# Patient Record
Sex: FEMALE | URBAN - METROPOLITAN AREA
[De-identification: names, ages, dates, MRNs, and addresses within clinical notes are randomized per-mention and may not be internally consistent; named-entity substitution may affect disease eponyms.]

---

## 2017-09-23 ENCOUNTER — INPATIENT (INPATIENT)
Facility: HOSPITAL | Age: 57
LOS: 1 days | Discharge: HOME | End: 2017-09-25
Attending: FAMILY MEDICINE | Admitting: FAMILY MEDICINE
Payer: COMMERCIAL

## 2017-09-23 VITALS
HEART RATE: 92 BPM | OXYGEN SATURATION: 100 % | WEIGHT: 139.99 LBS | DIASTOLIC BLOOD PRESSURE: 105 MMHG | HEIGHT: 62 IN | TEMPERATURE: 98 F | SYSTOLIC BLOOD PRESSURE: 185 MMHG | RESPIRATION RATE: 18 BRPM

## 2017-09-23 DIAGNOSIS — I20.0 UNSTABLE ANGINA: ICD-10-CM

## 2017-09-23 DIAGNOSIS — I10 ESSENTIAL (PRIMARY) HYPERTENSION: ICD-10-CM

## 2017-09-23 DIAGNOSIS — E89.40 ASYMPTOMATIC POSTPROCEDURAL OVARIAN FAILURE: Chronic | ICD-10-CM

## 2017-09-23 LAB
ALBUMIN SERPL ELPH-MCNC: 3.6 G/DL — SIGNIFICANT CHANGE UP (ref 3.3–5)
ALP SERPL-CCNC: 119 U/L — SIGNIFICANT CHANGE UP (ref 40–120)
ALT FLD-CCNC: 47 U/L — SIGNIFICANT CHANGE UP (ref 12–78)
ANION GAP SERPL CALC-SCNC: 7 MMOL/L — SIGNIFICANT CHANGE UP (ref 5–17)
APTT BLD: 103.4 SEC — HIGH (ref 27.5–37.4)
APTT BLD: 32.8 SEC — SIGNIFICANT CHANGE UP (ref 27.5–37.4)
APTT BLD: 53.2 SEC — HIGH (ref 27.5–37.4)
AST SERPL-CCNC: 32 U/L — SIGNIFICANT CHANGE UP (ref 15–37)
BASOPHILS # BLD AUTO: 0.1 K/UL — SIGNIFICANT CHANGE UP (ref 0–0.2)
BASOPHILS NFR BLD AUTO: 1 % — SIGNIFICANT CHANGE UP (ref 0–2)
BILIRUB SERPL-MCNC: 0.3 MG/DL — SIGNIFICANT CHANGE UP (ref 0.2–1.2)
BUN SERPL-MCNC: 13 MG/DL — SIGNIFICANT CHANGE UP (ref 7–23)
CALCIUM SERPL-MCNC: 9 MG/DL — SIGNIFICANT CHANGE UP (ref 8.5–10.1)
CHLORIDE SERPL-SCNC: 106 MMOL/L — SIGNIFICANT CHANGE UP (ref 96–108)
CHOLEST SERPL-MCNC: 224 MG/DL — HIGH (ref 10–199)
CK SERPL-CCNC: 120 U/L — SIGNIFICANT CHANGE UP (ref 26–192)
CO2 SERPL-SCNC: 27 MMOL/L — SIGNIFICANT CHANGE UP (ref 22–31)
CREAT SERPL-MCNC: 0.82 MG/DL — SIGNIFICANT CHANGE UP (ref 0.5–1.3)
EOSINOPHIL # BLD AUTO: 0.2 K/UL — SIGNIFICANT CHANGE UP (ref 0–0.5)
EOSINOPHIL NFR BLD AUTO: 3.8 % — SIGNIFICANT CHANGE UP (ref 0–6)
GLUCOSE SERPL-MCNC: 137 MG/DL — HIGH (ref 70–99)
HBA1C BLD-MCNC: 6.6 % — HIGH (ref 4–5.6)
HCT VFR BLD CALC: 42.5 % — SIGNIFICANT CHANGE UP (ref 34.5–45)
HCT VFR BLD CALC: 44.4 % — SIGNIFICANT CHANGE UP (ref 34.5–45)
HDLC SERPL-MCNC: 69 MG/DL — SIGNIFICANT CHANGE UP (ref 40–125)
HGB BLD-MCNC: 13.9 G/DL — SIGNIFICANT CHANGE UP (ref 11.5–15.5)
HGB BLD-MCNC: 14.5 G/DL — SIGNIFICANT CHANGE UP (ref 11.5–15.5)
INR BLD: 0.93 RATIO — SIGNIFICANT CHANGE UP (ref 0.88–1.16)
LIPID PNL WITH DIRECT LDL SERPL: 127 MG/DL — SIGNIFICANT CHANGE UP
LYMPHOCYTES # BLD AUTO: 2.5 K/UL — SIGNIFICANT CHANGE UP (ref 1–3.3)
LYMPHOCYTES # BLD AUTO: 44.3 % — HIGH (ref 13–44)
MCHC RBC-ENTMCNC: 28.9 PG — SIGNIFICANT CHANGE UP (ref 27–34)
MCHC RBC-ENTMCNC: 28.9 PG — SIGNIFICANT CHANGE UP (ref 27–34)
MCHC RBC-ENTMCNC: 32.6 GM/DL — SIGNIFICANT CHANGE UP (ref 32–36)
MCHC RBC-ENTMCNC: 32.7 GM/DL — SIGNIFICANT CHANGE UP (ref 32–36)
MCV RBC AUTO: 88.4 FL — SIGNIFICANT CHANGE UP (ref 80–100)
MCV RBC AUTO: 88.6 FL — SIGNIFICANT CHANGE UP (ref 80–100)
MONOCYTES # BLD AUTO: 0.4 K/UL — SIGNIFICANT CHANGE UP (ref 0–0.9)
MONOCYTES NFR BLD AUTO: 7.8 % — SIGNIFICANT CHANGE UP (ref 2–14)
NEUTROPHILS # BLD AUTO: 2.4 K/UL — SIGNIFICANT CHANGE UP (ref 1.8–7.4)
NEUTROPHILS NFR BLD AUTO: 43.1 % — SIGNIFICANT CHANGE UP (ref 43–77)
NT-PROBNP SERPL-SCNC: 27 PG/ML — SIGNIFICANT CHANGE UP (ref 0–125)
PLATELET # BLD AUTO: 189 K/UL — SIGNIFICANT CHANGE UP (ref 150–400)
PLATELET # BLD AUTO: 210 K/UL — SIGNIFICANT CHANGE UP (ref 150–400)
POTASSIUM SERPL-MCNC: 3.5 MMOL/L — SIGNIFICANT CHANGE UP (ref 3.5–5.3)
POTASSIUM SERPL-SCNC: 3.5 MMOL/L — SIGNIFICANT CHANGE UP (ref 3.5–5.3)
PROT SERPL-MCNC: 7.8 GM/DL — SIGNIFICANT CHANGE UP (ref 6–8.3)
PROTHROM AB SERPL-ACNC: 10 SEC — SIGNIFICANT CHANGE UP (ref 9.8–12.7)
RBC # BLD: 4.8 M/UL — SIGNIFICANT CHANGE UP (ref 3.8–5.2)
RBC # BLD: 5.03 M/UL — SIGNIFICANT CHANGE UP (ref 3.8–5.2)
RBC # FLD: 12.8 % — SIGNIFICANT CHANGE UP (ref 10.3–14.5)
RBC # FLD: 12.9 % — SIGNIFICANT CHANGE UP (ref 10.3–14.5)
SODIUM SERPL-SCNC: 140 MMOL/L — SIGNIFICANT CHANGE UP (ref 135–145)
TOTAL CHOLESTEROL/HDL RATIO MEASUREMENT: 3.2 RATIO — LOW (ref 3.3–7.1)
TRIGL SERPL-MCNC: 142 MG/DL — SIGNIFICANT CHANGE UP (ref 10–149)
TROPONIN I SERPL-MCNC: 0.02 NG/ML — SIGNIFICANT CHANGE UP (ref 0.01–0.04)
TROPONIN I SERPL-MCNC: 0.03 NG/ML — SIGNIFICANT CHANGE UP (ref 0.01–0.04)
TROPONIN I SERPL-MCNC: 0.03 NG/ML — SIGNIFICANT CHANGE UP (ref 0.01–0.04)
WBC # BLD: 5.6 K/UL — SIGNIFICANT CHANGE UP (ref 3.8–10.5)
WBC # BLD: 6 K/UL — SIGNIFICANT CHANGE UP (ref 3.8–10.5)
WBC # FLD AUTO: 5.6 K/UL — SIGNIFICANT CHANGE UP (ref 3.8–10.5)
WBC # FLD AUTO: 6 K/UL — SIGNIFICANT CHANGE UP (ref 3.8–10.5)

## 2017-09-23 PROCEDURE — 93306 TTE W/DOPPLER COMPLETE: CPT | Mod: 26

## 2017-09-23 PROCEDURE — 99285 EMERGENCY DEPT VISIT HI MDM: CPT

## 2017-09-23 PROCEDURE — 71010: CPT | Mod: 26

## 2017-09-23 PROCEDURE — 93010 ELECTROCARDIOGRAM REPORT: CPT

## 2017-09-23 RX ORDER — LOSARTAN POTASSIUM 100 MG/1
50 TABLET, FILM COATED ORAL DAILY
Qty: 0 | Refills: 0 | Status: DISCONTINUED | OUTPATIENT
Start: 2017-09-23 | End: 2017-09-24

## 2017-09-23 RX ORDER — HEPARIN SODIUM 5000 [USP'U]/ML
3800 INJECTION INTRAVENOUS; SUBCUTANEOUS EVERY 6 HOURS
Qty: 0 | Refills: 0 | Status: DISCONTINUED | OUTPATIENT
Start: 2017-09-23 | End: 2017-09-23

## 2017-09-23 RX ORDER — HEPARIN SODIUM 5000 [USP'U]/ML
3800 INJECTION INTRAVENOUS; SUBCUTANEOUS ONCE
Qty: 0 | Refills: 0 | Status: DISCONTINUED | OUTPATIENT
Start: 2017-09-23 | End: 2017-09-23

## 2017-09-23 RX ORDER — AMLODIPINE BESYLATE 2.5 MG/1
5 TABLET ORAL DAILY
Qty: 0 | Refills: 0 | Status: DISCONTINUED | OUTPATIENT
Start: 2017-09-23 | End: 2017-09-24

## 2017-09-23 RX ORDER — HEPARIN SODIUM 5000 [USP'U]/ML
4100 INJECTION INTRAVENOUS; SUBCUTANEOUS ONCE
Qty: 0 | Refills: 0 | Status: COMPLETED | OUTPATIENT
Start: 2017-09-23 | End: 2017-09-23

## 2017-09-23 RX ORDER — ATORVASTATIN CALCIUM 80 MG/1
20 TABLET, FILM COATED ORAL AT BEDTIME
Qty: 0 | Refills: 0 | Status: DISCONTINUED | OUTPATIENT
Start: 2017-09-23 | End: 2017-09-25

## 2017-09-23 RX ORDER — HEPARIN SODIUM 5000 [USP'U]/ML
4100 INJECTION INTRAVENOUS; SUBCUTANEOUS EVERY 6 HOURS
Qty: 0 | Refills: 0 | Status: DISCONTINUED | OUTPATIENT
Start: 2017-09-23 | End: 2017-09-25

## 2017-09-23 RX ORDER — METOPROLOL TARTRATE 50 MG
50 TABLET ORAL
Qty: 0 | Refills: 0 | Status: DISCONTINUED | OUTPATIENT
Start: 2017-09-23 | End: 2017-09-25

## 2017-09-23 RX ORDER — ASPIRIN/CALCIUM CARB/MAGNESIUM 324 MG
325 TABLET ORAL ONCE
Qty: 0 | Refills: 0 | Status: COMPLETED | OUTPATIENT
Start: 2017-09-23 | End: 2017-09-23

## 2017-09-23 RX ORDER — ASPIRIN/CALCIUM CARB/MAGNESIUM 324 MG
162 TABLET ORAL DAILY
Qty: 0 | Refills: 0 | Status: DISCONTINUED | OUTPATIENT
Start: 2017-09-23 | End: 2017-09-25

## 2017-09-23 RX ORDER — HEPARIN SODIUM 5000 [USP'U]/ML
INJECTION INTRAVENOUS; SUBCUTANEOUS
Qty: 25000 | Refills: 0 | Status: DISCONTINUED | OUTPATIENT
Start: 2017-09-23 | End: 2017-09-23

## 2017-09-23 RX ORDER — SODIUM CHLORIDE 9 MG/ML
3 INJECTION INTRAMUSCULAR; INTRAVENOUS; SUBCUTANEOUS ONCE
Qty: 0 | Refills: 0 | Status: COMPLETED | OUTPATIENT
Start: 2017-09-23 | End: 2017-09-23

## 2017-09-23 RX ORDER — HEPARIN SODIUM 5000 [USP'U]/ML
INJECTION INTRAVENOUS; SUBCUTANEOUS
Qty: 25000 | Refills: 0 | Status: DISCONTINUED | OUTPATIENT
Start: 2017-09-23 | End: 2017-09-25

## 2017-09-23 RX ADMIN — Medication 50 MILLIGRAM(S): at 10:44

## 2017-09-23 RX ADMIN — LOSARTAN POTASSIUM 50 MILLIGRAM(S): 100 TABLET, FILM COATED ORAL at 10:44

## 2017-09-23 RX ADMIN — HEPARIN SODIUM 4100 UNIT(S): 5000 INJECTION INTRAVENOUS; SUBCUTANEOUS at 06:32

## 2017-09-23 RX ADMIN — Medication 325 MILLIGRAM(S): at 02:40

## 2017-09-23 RX ADMIN — AMLODIPINE BESYLATE 5 MILLIGRAM(S): 2.5 TABLET ORAL at 10:44

## 2017-09-23 RX ADMIN — HEPARIN SODIUM 800 UNIT(S)/HR: 5000 INJECTION INTRAVENOUS; SUBCUTANEOUS at 06:32

## 2017-09-23 RX ADMIN — Medication 50 MILLIGRAM(S): at 18:26

## 2017-09-23 RX ADMIN — HEPARIN SODIUM 600 UNIT(S)/HR: 5000 INJECTION INTRAVENOUS; SUBCUTANEOUS at 21:50

## 2017-09-23 RX ADMIN — HEPARIN SODIUM 0 UNIT(S)/HR: 5000 INJECTION INTRAVENOUS; SUBCUTANEOUS at 15:06

## 2017-09-23 RX ADMIN — HEPARIN SODIUM 600 UNIT(S)/HR: 5000 INJECTION INTRAVENOUS; SUBCUTANEOUS at 16:00

## 2017-09-23 RX ADMIN — SODIUM CHLORIDE 3 MILLILITER(S): 9 INJECTION INTRAMUSCULAR; INTRAVENOUS; SUBCUTANEOUS at 03:05

## 2017-09-23 NOTE — H&P ADULT - ASSESSMENT
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 States she was sleeping and felt "heart burn, couldn't breathe" and woke up. Reports that pain has resolved upon arrival. Mentions that she has had similar episodes before. Notes that she was doing nothing out of the ordinary but did rush from basement for alarm. Pt has no other complaints and denies diaphoresis, lightheadedness, fever/chills, n/v/d and HA. Admitting being under a lot of stress related to job. Being admitted for ACS/Unstable angina with elevated blood pressure and abnormal EKG.

## 2017-09-23 NOTE — H&P ADULT - FAMILY HISTORY
Mother  Still living? Yes, Estimated age: 71-80  Family history of premature CAD, Age at diagnosis: Age Unknown  Family history of diabetes mellitus type II, Age at diagnosis: Age Unknown

## 2017-09-23 NOTE — ED ADULT NURSE NOTE - OBJECTIVE STATEMENT
pt states she woke up feeling short of breath. pt also c/o "slight chest pain at that time." Pt currently states shes feeling "less short of breath." pt denies dizziness or chest pain at this time.

## 2017-09-23 NOTE — H&P ADULT - NSHPOUTPATIENTPROVIDERS_GEN_ALL_CORE
Dr. Sky Raines-PCP (Cell given by patient [phone] and notified)  Dr. Edith Hamilton-cardiologist

## 2017-09-23 NOTE — H&P ADULT - NSHPATTENDINGPLANDISCUSS_GEN_ALL_CORE
patient, RN and PCP. patient, RN and PCP. Patient aware of medications and test results and in agreement in care plan. Questions answered.

## 2017-09-23 NOTE — H&P ADULT - PROBLEM SELECTOR PLAN 1
On IV Heparin, Aspirin, Lopressor, Losartan and Lipitor. Serial CE. Echo. Cardiology to evaluate for early angio vs stress test. No previous EKG to compare. Sedentary life style with known non obst. CAD with family history. On IV Heparin, Aspirin, Lopressor, Losartan and Lipitor. Serial CE. Echo. Cardiology to evaluate for early angio vs stress test. No previous EKG to compare. Sedentary life style with known non obst. CAD with family history. Aware of IV Heparin ordered placed in ED with side effects of bleeding.

## 2017-09-23 NOTE — H&P ADULT - ATTENDING COMMENTS
Diet: Low salt and low cholesterol.     RADIOLOGY & ADDITIONAL TESTS:    Imaging Personally Reviewed:  [ x] YES  [ ] NO    Consultant(s) Notes Reviewed:  [ ] YES  [ ] NO      DVT Prophylaxis:  IV Heparin [  x]     LMWH [ ]     Coumadin [ ]    Xaeralto [ ]    Eliquis [ ]   Venodyne pumps [ ]    Discussed with Patient [x ]     Family [ ]          [ ]   RN[ x]      [ ]    Advance Directives: Full code.       Care Discussed with Consultants/Other Providers [x ] YES  [ ] NO   PCP Dr. Ha. Diet: Low salt and low cholesterol.     RADIOLOGY & ADDITIONAL TESTS:    Imaging Personally Reviewed:  [ x] YES  [ ] NO    Consultant(s) Notes Reviewed:  [ ] YES  [ ] NO      DVT Prophylaxis:  IV Heparin [  x]     LMWH [ ]     Coumadin [ ]    Xaeralto [ ]    Eliquis [ ]   Venodyne pumps [ ]    Discussed with Patient [x ]     Family [ ]          [ ]   RN[ x]      [ ]    Advance Directives: Full code.       Care Discussed with Consultants/Other Providers [x ] YES  [ ] NO   PCP Dr. Ha. Requested to follow up upon discharge from hospital .

## 2017-09-23 NOTE — ED PROVIDER NOTE - OBJECTIVE STATEMENT
57F pmhx HTN presents to ED c/o SOB. States she was sleeping and felt "heart burn, couldn't breathe" and woke up. Reports that pain has resolved upon arrival. Mentions that she has had similar episodes before. Notes that she was doing nothing out of the ordinary. Pt has no other complaints and denies diaphoresis, lightheadedness, fever/chills, n/v/d and HA.

## 2017-09-23 NOTE — H&P ADULT - NSHPLABSRESULTS_GEN_ALL_CORE
NSR @ 76 with inverted T waves in V4-6 with no previous EKG to compare. NSR @ 76 with inverted T waves in V4-6 with no previous EKG to compare.    Chest x-ray no acute infiltrate or effusions.

## 2017-09-23 NOTE — H&P ADULT - NSHPPHYSICALEXAM_GEN_ALL_CORE
T(C): 36.7 (09-23-17 @ 07:47), Max: 36.9 (09-23-17 @ 05:17)  HR: 85 (09-23-17 @ 08:34) (72 - 92)  BP: 160/89 (09-23-17 @ 08:34) (153/89 - 185/105)  RR: 18 (09-23-17 @ 07:47) (18 - 20)  SpO2: 100% (09-23-17 @ 07:47) (100% - 100%)  Wt(kg): --  I&O's Summary      PHYSICAL EXAM:  GENERAL: NAD, well-groomed, well-developed  HEAD:  Atraumatic, Normocephalic  EYES: EOMI, PERRLA, conjunctiva and sclera clear  ENMT: No tonsillar erythema, exudates, or enlargement; Moist mucous membranes. No lesions.  NECK: Supple, No JVD, Normal thyroid  NERVOUS SYSTEM:  Alert & Oriented X3, Good concentration; Motor Strength 5/5 B/L upper and lower extremities.   CHEST/LUNG: Clear to percussion bilaterally; No rales, rhonchi, wheezing, or rubs  HEART: Regular rate and rhythm; No murmurs, rubs, or gallops  ABDOMEN: Soft, Nontender, Nondistended; Bowel sounds present  EXTREMITIES:  2+ Peripheral Pulses, No clubbing, cyanosis, or edema  LYMPH: No lymphadenopathy noted  SKIN: No rashes or lesions

## 2017-09-23 NOTE — H&P ADULT - HISTORY OF PRESENT ILLNESS
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 States she was sleeping and felt "heart burn, couldn't breathe" and woke up. Reports that pain has resolved upon arrival. Mentions that she has had similar episodes before. Notes that she was doing nothing out of the ordinary but did rush from basement for alarm. Pt has no other complaints and denies diaphoresis, lightheadedness, fever/chills, n/v/d and HA. Admitting being under a lot of stress related to job.

## 2017-09-24 DIAGNOSIS — T78.3XXA ANGIONEUROTIC EDEMA, INITIAL ENCOUNTER: ICD-10-CM

## 2017-09-24 LAB
ANION GAP SERPL CALC-SCNC: 4 MMOL/L — LOW (ref 5–17)
APTT BLD: 55.4 SEC — HIGH (ref 27.5–37.4)
BUN SERPL-MCNC: 13 MG/DL — SIGNIFICANT CHANGE UP (ref 7–23)
CALCIUM SERPL-MCNC: 9.4 MG/DL — SIGNIFICANT CHANGE UP (ref 8.5–10.1)
CHLORIDE SERPL-SCNC: 106 MMOL/L — SIGNIFICANT CHANGE UP (ref 96–108)
CO2 SERPL-SCNC: 29 MMOL/L — SIGNIFICANT CHANGE UP (ref 22–31)
CREAT SERPL-MCNC: 0.87 MG/DL — SIGNIFICANT CHANGE UP (ref 0.5–1.3)
GLUCOSE SERPL-MCNC: 108 MG/DL — HIGH (ref 70–99)
HCT VFR BLD CALC: 44.4 % — SIGNIFICANT CHANGE UP (ref 34.5–45)
HGB BLD-MCNC: 14.5 G/DL — SIGNIFICANT CHANGE UP (ref 11.5–15.5)
MCHC RBC-ENTMCNC: 28.6 PG — SIGNIFICANT CHANGE UP (ref 27–34)
MCHC RBC-ENTMCNC: 32.6 GM/DL — SIGNIFICANT CHANGE UP (ref 32–36)
MCV RBC AUTO: 87.6 FL — SIGNIFICANT CHANGE UP (ref 80–100)
PLATELET # BLD AUTO: 249 K/UL — SIGNIFICANT CHANGE UP (ref 150–400)
POTASSIUM SERPL-MCNC: 3.8 MMOL/L — SIGNIFICANT CHANGE UP (ref 3.5–5.3)
POTASSIUM SERPL-SCNC: 3.8 MMOL/L — SIGNIFICANT CHANGE UP (ref 3.5–5.3)
RBC # BLD: 5.07 M/UL — SIGNIFICANT CHANGE UP (ref 3.8–5.2)
RBC # FLD: 12.7 % — SIGNIFICANT CHANGE UP (ref 10.3–14.5)
SODIUM SERPL-SCNC: 139 MMOL/L — SIGNIFICANT CHANGE UP (ref 135–145)
WBC # BLD: 5.4 K/UL — SIGNIFICANT CHANGE UP (ref 3.8–10.5)
WBC # FLD AUTO: 5.4 K/UL — SIGNIFICANT CHANGE UP (ref 3.8–10.5)

## 2017-09-24 PROCEDURE — 93010 ELECTROCARDIOGRAM REPORT: CPT

## 2017-09-24 RX ORDER — DIPHENHYDRAMINE HCL 50 MG
25 CAPSULE ORAL EVERY 4 HOURS
Qty: 0 | Refills: 0 | Status: DISCONTINUED | OUTPATIENT
Start: 2017-09-24 | End: 2017-09-25

## 2017-09-24 RX ORDER — AMLODIPINE BESYLATE 2.5 MG/1
10 TABLET ORAL DAILY
Qty: 0 | Refills: 0 | Status: DISCONTINUED | OUTPATIENT
Start: 2017-09-24 | End: 2017-09-25

## 2017-09-24 RX ADMIN — Medication 50 MILLIGRAM(S): at 17:28

## 2017-09-24 RX ADMIN — Medication 50 MILLIGRAM(S): at 06:33

## 2017-09-24 RX ADMIN — Medication 40 MILLIGRAM(S): at 10:22

## 2017-09-24 RX ADMIN — AMLODIPINE BESYLATE 5 MILLIGRAM(S): 2.5 TABLET ORAL at 06:33

## 2017-09-24 RX ADMIN — LOSARTAN POTASSIUM 50 MILLIGRAM(S): 100 TABLET, FILM COATED ORAL at 06:33

## 2017-09-24 RX ADMIN — AMLODIPINE BESYLATE 10 MILLIGRAM(S): 2.5 TABLET ORAL at 15:17

## 2017-09-24 RX ADMIN — Medication 25 MILLIGRAM(S): at 06:39

## 2017-09-24 RX ADMIN — ATORVASTATIN CALCIUM 20 MILLIGRAM(S): 80 TABLET, FILM COATED ORAL at 21:22

## 2017-09-24 RX ADMIN — HEPARIN SODIUM 600 UNIT(S)/HR: 5000 INJECTION INTRAVENOUS; SUBCUTANEOUS at 05:02

## 2017-09-24 RX ADMIN — Medication 25 MILLIGRAM(S): at 09:57

## 2017-09-24 RX ADMIN — Medication 162 MILLIGRAM(S): at 10:21

## 2017-09-24 NOTE — PROGRESS NOTE ADULT - SUBJECTIVE AND OBJECTIVE BOX
CC: Indigestion with chest congestion woke her up with left sided neck discomfort. (23 Sep 2017 09:35)    HPI:  57F pmhx HTN and non obstructive CAD ~30% s/p cath 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 States she was sleeping and felt "heart burn, couldn't breathe" and woke up. Reports that pain has resolved upon arrival. Mentions that she has had similar episodes before. Notes that she was doing nothing out of the ordinary but did rush from basement for alarm. Pt has no other complaints and denies diaphoresis, lightheadedness, fever/chills, n/v/d and HA. Admitting being under a lot of stress related to job. (23 Sep 2017 09:35)    INTERVAL HPI/ OVERNIGHT EVENTS:    REVIEW OF SYSTEMS:    CONSTITUTIONAL: No weakness, fevers or chills  EYES/ENT: No visual changes;  No vertigo or throat pain   NECK: No pain or stiffness  RESPIRATORY: No cough, wheezing, hemoptysis; No shortness of breath  CARDIOVASCULAR: No chest pain or palpitations  GASTROINTESTINAL: No abdominal or epigastric pain. No nausea, vomiting, or hematemesis; No diarrhea or constipation. No melena or hematochezia.  GENITOURINARY: No dysuria, frequency or hematuria  NEUROLOGICAL: No numbness or weakness  SKIN: No itching, burning, rashes, or lesions   All other review of systems is negative unless indicated above.  Vital Signs Last 24 Hrs  T(C): 36.7 (24 Sep 2017 10:40), Max: 36.9 (24 Sep 2017 05:10)  T(F): 98 (24 Sep 2017 10:40), Max: 98.5 (24 Sep 2017 05:10)  HR: 74 (24 Sep 2017 10:40) (67 - 78)  BP: 148/77 (24 Sep 2017 10:40) (147/93 - 168/92)  BP(mean): 107 (23 Sep 2017 17:34) (107 - 107)  RR: 17 (24 Sep 2017 10:40) (16 - 18)  SpO2: 98% (24 Sep 2017 10:40) (97% - 100%)  I&O's Detail    23 Sep 2017 07:01  -  24 Sep 2017 07:00  --------------------------------------------------------  IN:  Total IN: 0 mL    OUT:    Voided: 400 mL  Total OUT: 400 mL    Total NET: -400 mL    PHYSICAL EXAM:    General: Well developed; well nourished; in no acute distress  Eyes: PERRLA, EOMI; conjunctiva and sclera clear  Head: Normocephalic; atraumatic  ENMT: No nasal discharge; airway clear  Neck: Supple; non tender; no masses  Respiratory: No wheezes, rales or rhonchi  Cardiovascular: Regular rate and rhythm. S1 and S2 Normal; No murmurs, gallops or rubs  Gastrointestinal: Soft non-tender non-distended; Normal bowel sounds  Genitourinary: No costovertebral angle tenderness  Extremities: Normal range of motion, No clubbing, cyanosis or edema  Vascular: Peripheral pulses palpable 2+ bilaterally  Neurological: Alert and oriented x4  Skin: Warm and dry. No acute rash  Lymph Nodes: No acute cervical adenopathy  Musculoskeletal: Normal gait, tone, without deformities  Psychiatric: Cooperative and appropriate      CARDIAC MARKERS ( 23 Sep 2017 12:40 )  0.018 ng/mL / x     / 120 U/L / x     / x      CARDIAC MARKERS ( 23 Sep 2017 05:44 )  0.031 ng/mL / x     / x     / x     / x      CARDIAC MARKERS ( 23 Sep 2017 02:21 )  0.033 ng/mL / x     / x     / x     / x                                14.5   5.4   )-----------( 249      ( 24 Sep 2017 04:25 )             44.4     24 Sep 2017 04:25    139    |  106    |  13     ----------------------------<  108    3.8     |  29     |  0.87     Ca    9.4        24 Sep 2017 04:25    TPro  7.8    /  Alb  3.6    /  TBili  0.3    /  DBili  x      /  AST  32     /  ALT  47     /  AlkPhos  119    23 Sep 2017 02:21    PT/INR - ( 23 Sep 2017 02:21 )   PT: 10.0 sec;   INR: 0.93 ratio         PTT - ( 24 Sep 2017 04:25 )  PTT:55.4 sec  CAPILLARY BLOOD GLUCOSE        LIVER FUNCTIONS - ( 23 Sep 2017 02:21 )  Alb: 3.6 g/dL / Pro: 7.8 gm/dL / ALK PHOS: 119 U/L / ALT: 47 U/L / AST: 32 U/L / GGT: x             Hemoglobin A1C, Whole Blood: 6.6 % (23 Sep 2017 12:40)    MEDICATIONS  (STANDING):  heparin  Infusion.  Unit(s)/Hr (8 mL/Hr) IV Continuous <Continuous>  amLODIPine   Tablet 5 milliGRAM(s) Oral daily  metoprolol 50 milliGRAM(s) Oral two times a day  atorvastatin 20 milliGRAM(s) Oral at bedtime  aspirin enteric coated 162 milliGRAM(s) Oral daily    MEDICATIONS  (PRN):  heparin  Injectable 4100 Unit(s) IV Push every 6 hours PRN For aPTT less than 40  diphenhydrAMINE   Capsule 25 milliGRAM(s) Oral every 4 hours PRN Rash and/or Itching      RADIOLOGY & ADDITIONAL TESTS: CC: Indigestion with chest congestion woke her up with left sided neck discomfort. 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 States she was sleeping and felt "heart burn, couldn't breathe" and woke up. Reports that pain has resolved upon arrival. Mentions that she has had similar episodes before. Notes that she was doing nothing out of the ordinary but did rush from basement for alarm. Pt has no other complaints and denies diaphoresis, lightheadedness, fever/chills, n/v/d and HA. Admitting being under a lot of stress related to job. (23 Sep 2017 09:35)    INTERVAL HPI/ OVERNIGHT EVENTS:  Pt was seen and examined,  reports no CP, but developed upper lip swelling. Pt reports that had episodes  of that in the past, once also had tongue swelling, usually takes benadryl. Pt is being evaluated for possible allergies with PCPs but didnt have allergy test yet. Also reports that her daughter has same problem.   Pt denies SOB or difficulty swallowing.  Results and POC discussed.     REVIEW OF SYSTEMS:    CONSTITUTIONAL: No weakness, fevers or chills  EYES/ENT: No visual changes;  No vertigo or throat pain   NECK: No pain or stiffness  RESPIRATORY: No cough, wheezing, hemoptysis; No shortness of breath  CARDIOVASCULAR: No chest pain or palpitations  GASTROINTESTINAL: No abdominal or epigastric pain. No nausea, vomiting, or hematemesis; No diarrhea or constipation. No melena or hematochezia.  GENITOURINARY: No dysuria, frequency or hematuria  NEUROLOGICAL: No numbness or weakness  SKIN: No itching, burning, rashes, or lesions   All other review of systems is negative unless indicated above.  Vital Signs Last 24 Hrs  T(C): 36.7 (24 Sep 2017 10:40), Max: 36.9 (24 Sep 2017 05:10)  T(F): 98 (24 Sep 2017 10:40), Max: 98.5 (24 Sep 2017 05:10)  HR: 74 (24 Sep 2017 10:40) (67 - 78)  BP: 148/77 (24 Sep 2017 10:40) (147/93 - 168/92)  BP(mean): 107 (23 Sep 2017 17:34) (107 - 107)  RR: 17 (24 Sep 2017 10:40) (16 - 18)  SpO2: 98% (24 Sep 2017 10:40) (97% - 100%)  I&O's Detail    23 Sep 2017 07:01  -  24 Sep 2017 07:00  --------------------------------------------------------  IN:  Total IN: 0 mL    OUT:    Voided: 400 mL  Total OUT: 400 mL    Total NET: -400 mL    PHYSICAL EXAM:    General: Well developed; well nourished; in no acute distress  Eyes: PERRLA, EOMI; conjunctiva and sclera clear  Head: Normocephalic; atraumatic  ENMT: No nasal discharge; airway clear  Neck: Supple; non tender; no masses  Respiratory: No wheezes, rales or rhonchi  Cardiovascular: Regular rate and rhythm. S1 and S2 Normal; No murmurs, gallops or rubs  Gastrointestinal: Soft non-tender non-distended; Normal bowel sounds  Genitourinary: No costovertebral angle tenderness  Extremities: Normal range of motion, No clubbing, cyanosis or edema  Vascular: Peripheral pulses palpable 2+ bilaterally  Neurological: Alert and oriented x4  Skin: Warm and dry. No acute rash  Lymph Nodes: No acute cervical adenopathy  Musculoskeletal: Normal gait, tone, without deformities  Psychiatric: Cooperative and appropriate      CARDIAC MARKERS ( 23 Sep 2017 12:40 )  0.018 ng/mL / x     / 120 U/L / x     / x      CARDIAC MARKERS ( 23 Sep 2017 05:44 )  0.031 ng/mL / x     / x     / x     / x      CARDIAC MARKERS ( 23 Sep 2017 02:21 )  0.033 ng/mL / x     / x     / x     / x                                14.5   5.4   )-----------( 249      ( 24 Sep 2017 04:25 )             44.4     24 Sep 2017 04:25    139    |  106    |  13     ----------------------------<  108    3.8     |  29     |  0.87     Ca    9.4        24 Sep 2017 04:25    TPro  7.8    /  Alb  3.6    /  TBili  0.3    /  DBili  x      /  AST  32     /  ALT  47     /  AlkPhos  119    23 Sep 2017 02:21    PT/INR - ( 23 Sep 2017 02:21 )   PT: 10.0 sec;   INR: 0.93 ratio         PTT - ( 24 Sep 2017 04:25 )  PTT:55.4 sec  CAPILLARY BLOOD GLUCOSE        LIVER FUNCTIONS - ( 23 Sep 2017 02:21 )  Alb: 3.6 g/dL / Pro: 7.8 gm/dL / ALK PHOS: 119 U/L / ALT: 47 U/L / AST: 32 U/L / GGT: x             Hemoglobin A1C, Whole Blood: 6.6 % (23 Sep 2017 12:40)    MEDICATIONS  (STANDING):  heparin  Infusion.  Unit(s)/Hr (8 mL/Hr) IV Continuous <Continuous>  amLODIPine   Tablet 5 milliGRAM(s) Oral daily  metoprolol 50 milliGRAM(s) Oral two times a day  atorvastatin 20 milliGRAM(s) Oral at bedtime  aspirin enteric coated 162 milliGRAM(s) Oral daily    MEDICATIONS  (PRN):  heparin  Injectable 4100 Unit(s) IV Push every 6 hours PRN For aPTT less than 40  diphenhydrAMINE   Capsule 25 milliGRAM(s) Oral every 4 hours PRN Rash and/or Itching      RADIOLOGY & ADDITIONAL TESTS: CC: Indigestion with chest congestion woke her up with left sided neck discomfort. 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 States she was sleeping and felt "heart burn, couldn't breathe" and woke up. Reports that pain has resolved upon arrival. Mentions that she has had similar episodes before. Notes that she was doing nothing out of the ordinary but did rush from basement for alarm. Pt has no other complaints and denies diaphoresis, lightheadedness, fever/chills, n/v/d and HA. Admitting being under a lot of stress related to job. (23 Sep 2017 09:35)    INTERVAL HPI/ OVERNIGHT EVENTS:  Pt was seen and examined,  reports no CP, but developed upper lip swelling. Pt reports that had episodes  of that in the past, once also had tongue swelling, usually takes benadryl. Pt is being evaluated for possible allergies with PCPs but didnt have allergy test yet. Also reports that her daughter has same problem.   Pt does not take ARB on daily bases   Pt denies SOB or difficulty swallowing.  Results and POC discussed.     REVIEW OF SYSTEMS:    CONSTITUTIONAL: No weakness, fevers or chills  EYES/ENT: No visual changes;  No vertigo or throat pain, + upper lip swelling, no drooling   NECK: No pain or stiffness  RESPIRATORY: No cough, wheezing, hemoptysis; No shortness of breath  CARDIOVASCULAR: No chest pain or palpitations  GASTROINTESTINAL: No abdominal or epigastric pain. No nausea, vomiting, or hematemesis; No diarrhea or constipation. No melena or hematochezia.  GENITOURINARY: No dysuria, frequency or hematuria  NEUROLOGICAL: No numbness or weakness  SKIN: No itching, burning, rashes, or lesions   All other review of systems is negative unless indicated above.    Vital Signs Last 24 Hrs  T(C): 36.7 (24 Sep 2017 10:40), Max: 36.9 (24 Sep 2017 05:10)  T(F): 98 (24 Sep 2017 10:40), Max: 98.5 (24 Sep 2017 05:10)  HR: 74 (24 Sep 2017 10:40) (67 - 78)  BP: 148/77 (24 Sep 2017 10:40) (147/93 - 168/92)  BP(mean): 107 (23 Sep 2017 17:34) (107 - 107)  RR: 17 (24 Sep 2017 10:40) (16 - 18)  SpO2: 98% (24 Sep 2017 10:40) (97% - 100%)  I&O's Detail    23 Sep 2017 07:01  -  24 Sep 2017 07:00  --------------------------------------------------------  IN:  Total IN: 0 mL    OUT:    Voided: 400 mL  Total OUT: 400 mL    Total NET: -400 mL    PHYSICAL EXAM:    General: Well developed; well nourished; in no acute distress  Eyes: PERRLA, EOMI; conjunctiva and sclera clear  Head: Normocephalic; atraumatic  ENMT: No nasal discharge; airway clear, + upper lip edema, no erythema. Tongue wnl, no pharyngeal edema   Neck: Supple; non tender; no masses  Respiratory:  Good air entry, No wheezes, rales or rhonchi  Cardiovascular: Regular rate and rhythm. S1 and S2 Normal; No murmurs  Gastrointestinal: Soft non-tender non-distended; Normal bowel sounds  Genitourinary: No costovertebral angle tenderness  Extremities: Normal range of motion, No  LE  edema  Vascular: Peripheral pulses palpable 2+ bilaterally  Neurological: Alert and oriented x4, non focal   Skin: Warm and dry. No acute rash  Lymph Nodes: No acute cervical adenopathy  Musculoskeletal: Normal  muscle  tone and strength   Psychiatric: Cooperative and appropriate    LABS:    CARDIAC MARKERS ( 23 Sep 2017 12:40 )  0.018 ng/mL / x     / 120 U/L / x     / x      CARDIAC MARKERS ( 23 Sep 2017 05:44 )  0.031 ng/mL / x     / x     / x     / x      CARDIAC MARKERS ( 23 Sep 2017 02:21 )  0.033 ng/mL / x     / x     / x     / x                                14.5   5.4   )-----------( 249      ( 24 Sep 2017 04:25 )             44.4     24 Sep 2017 04:25    139    |  106    |  13     ----------------------------<  108    3.8     |  29     |  0.87     Ca    9.4        24 Sep 2017 04:25    TPro  7.8    /  Alb  3.6    /  TBili  0.3    /  DBili  x      /  AST  32     /  ALT  47     /  AlkPhos  119    23 Sep 2017 02:21  PT/INR - ( 23 Sep 2017 02:21 )   PT: 10.0 sec;   INR: 0.93 ratio    PTT - ( 24 Sep 2017 04:25 )  PTT:55.4 sec  LIVER FUNCTIONS - ( 23 Sep 2017 02:21 )  Alb: 3.6 g/dL / Pro: 7.8 gm/dL / ALK PHOS: 119 U/L / ALT: 47 U/L / AST: 32 U/L / GGT: x             Hemoglobin A1C, Whole Blood: 6.6 % (23 Sep 2017 12:40)    MEDICATIONS  (STANDING):  heparin  Infusion.  Unit(s)/Hr (8 mL/Hr) IV Continuous <Continuous>  amLODIPine   Tablet 5 milliGRAM(s) Oral daily  metoprolol 50 milliGRAM(s) Oral two times a day  atorvastatin 20 milliGRAM(s) Oral at bedtime  aspirin enteric coated 162 milliGRAM(s) Oral daily    MEDICATIONS  (PRN):  heparin  Injectable 4100 Unit(s) IV Push every 6 hours PRN For aPTT less than 40  diphenhydrAMINE   Capsule 25 milliGRAM(s) Oral every 4 hours PRN Rash and/or Itching        RADIOLOGY & ADDITIONAL TESTS:    EXAM:  CHEST SINGLE VIEW FRONTAL                        PROCEDURE DATE:  09/23/2017      The cardiac silhouette is within normal limits. The lungs are clear. No   pleural abnormality.    IMPRESSION: Normal AP chest.            12 Lead ECG (09.23.17 @ 02:23)  Ventricular Rate 76 BPM  Atrial Rate 76 BPM  P-R Interval 160 ms  QRS Duration 72 ms  Q-T Interval 400 ms  QTC Calculation(Bezet) 450 ms  Diagnosis:   Normal sinus rhythm  Voltage criteria for left ventricular hypertrophy  T wave abnormality, consider lateral ischemia  Abnormal ECG  No previous ECGs available       EXAM:  2D ECHOCARDIOGRAM AD    PROCEDURE DATE:  09/23/2017         Summary   The aortic valve is trileaflet with thin pliable leaflets.   Normal appearing left atrium.     Mild concentric left ventricular hypertrophy is present.   Estimated left ventricular ejection fraction is 65-70 %.     All visualized extra cardiac structures appears to be normal.     Fibrocalcific changes noted to the mitral valve leaflets with preserved   leaflet excursion.   Trace mitral regurgitation is present.   EA reversal of the mitral inflow consistent with reduced compliance of   the   left ventricle.     No evidence of pericardial effusion.   No evidence of pleural effusion.     Pulmonic valve not well seen, probably normal pulmonic valve function.   Moderate pulmonic valvular regurgitation (2+) is present.   Normalappearing right atrium.   Normal appearing right ventricle structure and function.   Trace tricuspid valve regurgitation is present.

## 2017-09-24 NOTE — PROGRESS NOTE ADULT - PROBLEM SELECTOR PLAN 1
On IV Heparin, Aspirin, Lopressor, Losartan and Lipitor. Serial CE. Echo. Cardiology to evaluate for early angio vs stress test. No previous EKG to compare. Sedentary life style with known non obst. CAD with family history. Aware of IV Heparin ordered placed in ED with side effects of bleeding. no current CP  EKG repeat: NSR, T wave inversions in lateral and anterior leads   HARISH neg   ECHO: Normal EF, + LVH, + 2PVR   C/w  IV Heparin, Aspirin, Lopressor,  Lipitor  Cardiology  eval appreciated, plan for angio in am

## 2017-09-24 NOTE — PROGRESS NOTE ADULT - PROBLEM SELECTOR PLAN 2
Medicine compliance. Does not take ARB regularly. Order medication and follow up. BP not at target   Will hold ARB due to lip edema  Monitor BP, might need to adjust BP meds  Will d/w cardio in am

## 2017-09-24 NOTE — CONSULT NOTE ADULT - ASSESSMENT
h/o HTN and nonobstr CAD on cath 10 years ago now with SSCP and EKG changes .Typical for unstable angina   1) Cath in AM with DR Joyce   2) cont IV hep , ASA, statin . bblocker No

## 2017-09-24 NOTE — CONSULT NOTE ADULT - SUBJECTIVE AND OBJECTIVE BOX
Patient is a 57y old  Female who presents with a chief complaint of Indigestion with chest congestion woke her up with left sided neck discomfort. 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 States she was sleeping and felt "heart burn, couldn't breathe" and woke up. Reports that pain has resolved upon arrival. IT lasted 2 hours . She also states that prior to that earlier in the day she had c/o SOB when running up her stairs in her house  Mentions that she has had similar episodes before. Notes that she was doing nothing out of the ordinary but did rush from basement for alarm. Pt has no other complaints and denies diaphoresis, lightheadedness, fever/chills, n/v/d and HA. Admitting being under a lot of stress related to job. (23 Sep 2017 09:35)  Symptom free this AM     PAST MEDICAL & SURGICAL HISTORY:  HTN (hypertension)  Post-hysterectomy menopause: For fibroids.                                    MEDICATIONS  (STANDING):  heparin  Infusion.  Unit(s)/Hr (8 mL/Hr) IV Continuous <Continuous>  amLODIPine   Tablet 5 milliGRAM(s) Oral daily  metoprolol 50 milliGRAM(s) Oral two times a day  losartan 50 milliGRAM(s) Oral daily  atorvastatin 20 milliGRAM(s) Oral at bedtime  aspirin enteric coated 162 milliGRAM(s) Oral daily    MEDICATIONS  (PRN):  heparin  Injectable 4100 Unit(s) IV Push every 6 hours PRN For aPTT less than 40  diphenhydrAMINE   Capsule 25 milliGRAM(s) Oral every 4 hours PRN Rash and/or Itching      FAMILY HISTORY:  Family history of diabetes mellitus type II (Mother)  Family history of premature CAD (Mother)      SOCIAL HISTORY:  non smoker  CIGARETTES:        Vital Signs Last 24 Hrs  T(C): 36.7 (24 Sep 2017 10:40), Max: 36.9 (24 Sep 2017 05:10)  T(F): 98 (24 Sep 2017 10:40), Max: 98.5 (24 Sep 2017 05:10)  HR: 74 (24 Sep 2017 10:40) (67 - 78)  BP: 148/77 (24 Sep 2017 10:40) (147/93 - 168/92)  BP(mean): 107 (23 Sep 2017 17:34) (107 - 107)  RR: 17 (24 Sep 2017 10:40) (16 - 18)  SpO2: 98% (24 Sep 2017 10:40) (97% - 100%)            INTERPRETATION OF TELEMETRY: SR     ECG:  SR with LVH and T wave in versions laterally   I&O's Detail    23 Sep 2017 07:01  -  24 Sep 2017 07:00  --------------------------------------------------------  IN:  Total IN: 0 mL    OUT:    Voided: 400 mL  Total OUT: 400 mL    Total NET: -400 mL          LABS:                        14.5   5.4   )-----------( 249      ( 24 Sep 2017 04:25 )             44.4     09-24    139  |  106  |  13  ----------------------------<  108<H>  3.8   |  29  |  0.87    Ca    9.4      24 Sep 2017 04:25    TPro  7.8  /  Alb  3.6  /  TBili  0.3  /  DBili  x   /  AST  32  /  ALT  47  /  AlkPhos  119  09-23    CARDIAC MARKERS ( 23 Sep 2017 12:40 )  0.018 ng/mL / x     / 120 U/L / x     / x      CARDIAC MARKERS ( 23 Sep 2017 05:44 )  0.031 ng/mL / x     / x     / x     / x      CARDIAC MARKERS ( 23 Sep 2017 02:21 )  0.033 ng/mL / x     / x     / x     / x          PT/INR - ( 23 Sep 2017 02:21 )   PT: 10.0 sec;   INR: 0.93 ratio         PTT - ( 24 Sep 2017 04:25 )  PTT:55.4 sec    I&O's Summary    23 Sep 2017 07:01  -  24 Sep 2017 07:00  --------------------------------------------------------  IN: 0 mL / OUT: 400 mL / NET: -400 mL      BNP  RADIOLOGY & ADDITIONAL STUDIES:

## 2017-09-24 NOTE — PROGRESS NOTE ADULT - PROBLEM SELECTOR PLAN 3
had previous episodes in the past, allergic reaction vs hereditary AE  IV solumedrol x 1 given  C/w benadryl  Monitor O2 sats   Losartan on Hold   D/w Dr Hector, Pt's PCP, reports strong h/o allergies, unknown agent yet, recommends steroids before IV contrast for angio

## 2017-09-25 ENCOUNTER — TRANSCRIPTION ENCOUNTER (OUTPATIENT)
Age: 57
End: 2017-09-25

## 2017-09-25 VITALS
TEMPERATURE: 98 F | RESPIRATION RATE: 18 BRPM | DIASTOLIC BLOOD PRESSURE: 55 MMHG | HEART RATE: 78 BPM | OXYGEN SATURATION: 100 % | SYSTOLIC BLOOD PRESSURE: 114 MMHG

## 2017-09-25 LAB
ANION GAP SERPL CALC-SCNC: 7 MMOL/L — SIGNIFICANT CHANGE UP (ref 5–17)
APTT BLD: 34.9 SEC — SIGNIFICANT CHANGE UP (ref 27.5–37.4)
BUN SERPL-MCNC: 22 MG/DL — SIGNIFICANT CHANGE UP (ref 7–23)
CALCIUM SERPL-MCNC: 9.4 MG/DL — SIGNIFICANT CHANGE UP (ref 8.5–10.1)
CHLORIDE SERPL-SCNC: 104 MMOL/L — SIGNIFICANT CHANGE UP (ref 96–108)
CO2 SERPL-SCNC: 27 MMOL/L — SIGNIFICANT CHANGE UP (ref 22–31)
CREAT SERPL-MCNC: 1.02 MG/DL — SIGNIFICANT CHANGE UP (ref 0.5–1.3)
GLUCOSE SERPL-MCNC: 188 MG/DL — HIGH (ref 70–99)
HCT VFR BLD CALC: 42.8 % — SIGNIFICANT CHANGE UP (ref 34.5–45)
HGB BLD-MCNC: 14.4 G/DL — SIGNIFICANT CHANGE UP (ref 11.5–15.5)
MCHC RBC-ENTMCNC: 29.3 PG — SIGNIFICANT CHANGE UP (ref 27–34)
MCHC RBC-ENTMCNC: 33.7 GM/DL — SIGNIFICANT CHANGE UP (ref 32–36)
MCV RBC AUTO: 87.1 FL — SIGNIFICANT CHANGE UP (ref 80–100)
PLATELET # BLD AUTO: 230 K/UL — SIGNIFICANT CHANGE UP (ref 150–400)
POTASSIUM SERPL-MCNC: 4.2 MMOL/L — SIGNIFICANT CHANGE UP (ref 3.5–5.3)
POTASSIUM SERPL-SCNC: 4.2 MMOL/L — SIGNIFICANT CHANGE UP (ref 3.5–5.3)
RBC # BLD: 4.91 M/UL — SIGNIFICANT CHANGE UP (ref 3.8–5.2)
RBC # FLD: 12.9 % — SIGNIFICANT CHANGE UP (ref 10.3–14.5)
SODIUM SERPL-SCNC: 138 MMOL/L — SIGNIFICANT CHANGE UP (ref 135–145)
WBC # BLD: 14.4 K/UL — HIGH (ref 3.8–10.5)
WBC # FLD AUTO: 14.4 K/UL — HIGH (ref 3.8–10.5)

## 2017-09-25 PROCEDURE — 93010 ELECTROCARDIOGRAM REPORT: CPT

## 2017-09-25 RX ORDER — DIPHENHYDRAMINE HCL 50 MG
50 CAPSULE ORAL EVERY 4 HOURS
Qty: 0 | Refills: 0 | Status: DISCONTINUED | OUTPATIENT
Start: 2017-09-25 | End: 2017-09-25

## 2017-09-25 RX ORDER — ASPIRIN/CALCIUM CARB/MAGNESIUM 324 MG
2 TABLET ORAL
Qty: 0 | Refills: 0 | COMMUNITY
Start: 2017-09-25

## 2017-09-25 RX ORDER — FAMOTIDINE 10 MG/ML
1 INJECTION INTRAVENOUS
Qty: 30 | Refills: 0 | OUTPATIENT
Start: 2017-09-25 | End: 2017-10-25

## 2017-09-25 RX ORDER — DIPHENHYDRAMINE HCL 50 MG
1 CAPSULE ORAL
Qty: 0 | Refills: 0 | COMMUNITY
Start: 2017-09-25

## 2017-09-25 RX ORDER — METFORMIN HYDROCHLORIDE 850 MG/1
1 TABLET ORAL
Qty: 30 | Refills: 0 | OUTPATIENT
Start: 2017-09-25 | End: 2017-10-25

## 2017-09-25 RX ORDER — SODIUM CHLORIDE 9 MG/ML
1000 INJECTION INTRAMUSCULAR; INTRAVENOUS; SUBCUTANEOUS
Qty: 0 | Refills: 0 | Status: DISCONTINUED | OUTPATIENT
Start: 2017-09-25 | End: 2017-09-25

## 2017-09-25 RX ORDER — DEXAMETHASONE 0.5 MG/5ML
4 ELIXIR ORAL ONCE
Qty: 0 | Refills: 0 | Status: COMPLETED | OUTPATIENT
Start: 2017-09-25 | End: 2017-09-25

## 2017-09-25 RX ORDER — AMLODIPINE BESYLATE 2.5 MG/1
1 TABLET ORAL
Qty: 30 | Refills: 0 | OUTPATIENT
Start: 2017-09-25 | End: 2017-10-25

## 2017-09-25 RX ORDER — ATORVASTATIN CALCIUM 80 MG/1
1 TABLET, FILM COATED ORAL
Qty: 30 | Refills: 0 | OUTPATIENT
Start: 2017-09-25 | End: 2017-10-25

## 2017-09-25 RX ADMIN — AMLODIPINE BESYLATE 10 MILLIGRAM(S): 2.5 TABLET ORAL at 05:55

## 2017-09-25 RX ADMIN — HEPARIN SODIUM 4100 UNIT(S): 5000 INJECTION INTRAVENOUS; SUBCUTANEOUS at 07:33

## 2017-09-25 RX ADMIN — Medication 4 MILLIGRAM(S): at 08:53

## 2017-09-25 RX ADMIN — Medication 50 MILLIGRAM(S): at 08:54

## 2017-09-25 RX ADMIN — HEPARIN SODIUM 800 UNIT(S)/HR: 5000 INJECTION INTRAVENOUS; SUBCUTANEOUS at 07:31

## 2017-09-25 RX ADMIN — Medication 50 MILLIGRAM(S): at 05:55

## 2017-09-25 RX ADMIN — Medication 162 MILLIGRAM(S): at 13:29

## 2017-09-25 NOTE — DISCHARGE NOTE ADULT - ADDITIONAL INSTRUCTIONS
F/u with PCP in 3-4 days, Cardiologist in 1-2 weeks, Allergy specialist F/u with PCP in 3-4 days, Cardiologist in 1-2 weeks, Allergy specialist  Charmaine Wood [phone]  [facility]      [address],  [address]

## 2017-09-25 NOTE — DISCHARGE NOTE ADULT - PATIENT PORTAL LINK FT
“You can access the [facility] Patient Portal, offered by [facility], by registering with the following website: [email or web address]”

## 2017-09-25 NOTE — DISCHARGE NOTE ADULT - HOSPITAL COURSE
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 Reports that pain has resolved upon arrival to the . Also had  episodes before.  Had few episodes od SOB with some exertion. Pt was admitted and had ischemia work up done, which was positive for Non obstructive CAD on angiogram. Noted pt's BP poorly controlled, Pt reported non adherence to BP meds. Started on new med. Hospital course notable for upper lip swelling, which as per Pt not new and is planned for allergy test, also recommend to evaluate for   angioedema. Pt is cleared by cardio for d/c   Vital Signs Last 24 Hrs  T(C): 36.7 (25 Sep 2017 15:14), Max: 37 (24 Sep 2017 17:06)  T(F): 98 (25 Sep 2017 15:14), Max: 98.6 (24 Sep 2017 17:06)  HR: 87 (25 Sep 2017 15:14) (77 - 88)  BP: 126/61 (25 Sep 2017 15:14) (125/77 - 164/91)  RR: 16 (25 Sep 2017 15:14) (16 - 18)  SpO2: 99% (25 Sep 2017 15:14) (10% - 100%)  REVIEW OF SYSTEMS:  CONSTITUTIONAL: No weakness, fevers or chills  EYES/ENT: No visual changes;  No vertigo or throat pain, + upper lip swelling improved,  no drooling, no difficulty swallowing   NECK: No pain or stiffness  RESPIRATORY: No cough, wheezing, hemoptysis; No shortness of breath  CARDIOVASCULAR: No chest pain or palpitations  GASTROINTESTINAL: No abdominal or epigastric pain. No nausea, vomiting, or hematemesis; No diarrhea or constipation. No melena or hematochezia.  GENITOURINARY: No dysuria, frequency or hematuria  NEUROLOGICAL: No numbness or weakness  SKIN: No itching, burning, rashes, or lesions   All other review of systems is negative unless indicated above.  PHYSICAL EXAM:    General: Well developed; well nourished; in no acute distress  Eyes: PERRLA, EOMI; conjunctiva and sclera clear  Head: Normocephalic; atraumatic  ENMT: No nasal discharge; airway clear, + upper lip edema much improved, no erythema. Tongue wnl, no pharyngeal edema   Neck: Supple; non tender; no masses  Respiratory:  Good air entry, No wheezes, rales or rhonchi  Cardiovascular: Regular rate and rhythm. S1 and S2 Normal; No murmurs  Gastrointestinal: Soft non-tender non-distended; Normal bowel sounds  Genitourinary: No costovertebral angle tenderness  Extremities: Normal range of motion, No  LE  edema  Vascular: Peripheral pulses palpable 2+ bilaterally  Neurological: Alert and oriented x4, non focal   Skin: Warm and dry. 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 States she was sleeping and felt "heart burn, couldn't breathe" and woke up. Reports that pain has resolved upon arrival. Mentions that she has had similar episodes before. Notes that she was doing nothing out of the ordinary but did rush from basement for alarm. Pt has no other complaints and denies diaphoresis, lightheadedness, fever/chills, n/v/d and HA. Admitting being under a lot of stress related to job. Being admitted for ACS/Unstable angina with elevated blood pressure and abnormal EKG.      Problem/Plan - 1:  Atypical CP, Non Obstructive CAD  EKG repeat: NSR, T wave inversions in lateral and anterior leads   HARISH neg   ECHO: Normal EF, + LVH, + 2PVR   C/w   Aspirin, BB and  Lipitor  Will add trial of Pepcid for Possible GERD  D/w DR Joyce, cardiac cath: non obstructive Dz, aggressive management of risk factors recommended. Will f/u outpt       Problem/Plan - 2:  Hypertension.   BP not at target   D/c  ARB due to lip edema  C/w BB and Norvasc increased   Will add HCTZ as per cardio recommendations  F/u with PC for BP check, labs and further adjustments     Problem/Plan - 3:  Swelling  of lips  Allergic vs angioedema   had previous episodes in the past  IV solumedrol x 1 given  C/w benadryl PRN  Avoid ARB/ACEI   D/w Dr Hector, Pt's PCP, reports strong h/o allergies, unknown agent yet, planned for allergy test, Allergy specialist eval     DM, Type II  HB A1c 6.6  Low fat diet  Diabetic education   Start Metformin 500mg Po QD  F/u with PCP for labs and further management     Dispo: Stable for d/c home today

## 2017-09-25 NOTE — DISCHARGE NOTE ADULT - PLAN OF CARE
resolve c/w diet and Aspirin control Low sodium diet, medications  compliance evaluate Need  allergologist evaluation Low carb diet, start new med and f/u with PCP

## 2017-09-25 NOTE — DISCHARGE NOTE ADULT - MEDICATION SUMMARY - MEDICATIONS TO STOP TAKING
I will STOP taking the medications listed below when I get home from the hospital:    Edarbi  -- 40 milligram(s) orally    Edarbi  -- 40 milligram(s) by mouth once a day

## 2017-09-25 NOTE — DISCHARGE NOTE ADULT - MEDICATION SUMMARY - MEDICATIONS TO TAKE
I will START or STAY ON the medications listed below when I get home from the hospital:    aspirin 81 mg oral delayed release tablet  -- 2 tab(s) by mouth once a day  -- Indication: For CAD    metFORMIN 500 mg oral tablet, extended release  -- 1 tab(s) by mouth once a day (at bedtime)   -- Check with your doctor before becoming pregnant.  Do not drink alcoholic beverages when taking this medication.  Obtain medical advice before taking any non-prescription drugs as some may affect the action of this medication.  Swallow whole.  Do not crush.  Take with food or milk.    -- Indication: For DM Type II    diphenhydrAMINE 25 mg oral capsule  -- 1 cap(s) by mouth every 6 hours, As Needed for lip/face swelling   -- Indication: For Lip swelling     atorvastatin 40 mg oral tablet  -- 1 tab(s) by mouth once a day   -- Avoid grapefruit and grapefruit juice while taking this medication.  Do not take this drug if you are pregnant.  It is very important that you take or use this exactly as directed.  Do not skip doses or discontinue unless directed by your doctor.  Obtain medical advice before taking any non-prescription drugs as some may affect the action of this medication.  Take with food or milk.    -- Indication: For HLD    Bystolic  -- 10 mg orally  -- Indication: For HTN (hypertension)    amLODIPine 10 mg oral tablet  -- 1 tab(s) by mouth once a day  -- Indication: For HTN (hypertension)    hydroCHLOROthiazide 12.5 mg oral capsule  -- 1 cap(s) by mouth once a day   -- Avoid prolonged or excessive exposure to direct and/or artificial sunlight while taking this medication.  It is very important that you take or use this exactly as directed.  Do not skip doses or discontinue unless directed by your doctor.  It may be advisable to drink a full glass orange juice or eat a banana daily while taking this medication.  Take with food or milk.    -- Indication: For HTN (hypertension)    Pepcid 40 mg oral tablet  -- 1 tab(s) by mouth once a day   -- It is very important that you take or use this exactly as directed.  Do not skip doses or discontinue unless directed by your doctor.  Obtain medical advice before taking any non-prescription drugs as some may affect the action of this medication.    -- Indication: For GERD

## 2017-09-25 NOTE — DISCHARGE NOTE ADULT - MEDICATION SUMMARY - MEDICATIONS TO CHANGE
I will SWITCH the dose or number of times a day I take the medications listed below when I get home from the hospital:    amLODIPine    amLODIPine  -- 5 mg  orally

## 2017-09-25 NOTE — PROGRESS NOTE ADULT - SUBJECTIVE AND OBJECTIVE BOX
Patient is a 57y old  Female who presents with a chief complaint of Indigestion with chest congestion woke her up with left sided neck discomfort. 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 States she was sleeping and felt "heart burn, couldn't breathe" and woke up. Reports that pain has resolved upon arrival. IT lasted 2 hours . She also states that prior to that earlier in the day she had c/o SOB when running up her stairs in her house  Mentions that she has had similar episodes before. Notes that she was doing nothing out of the ordinary but did rush from basement for alarm. Pt has no other complaints and denies diaphoresis, lightheadedness, fever/chills, n/v/d and HA. Admitting being under a lot of stress related to job. (23 Sep 2017 09:35)  Symptom free this AM   9/25 S/P LHC revealing increased tortuosity in coronary arteries but no evidence of obstructive disease    PAST MEDICAL & SURGICAL HISTORY:  HTN (hypertension)  Post-hysterectomy menopause: For fibroids.                                    MEDICATIONS  (STANDING):  heparin  Infusion.  Unit(s)/Hr (8 mL/Hr) IV Continuous <Continuous>  amLODIPine   Tablet 5 milliGRAM(s) Oral daily  metoprolol 50 milliGRAM(s) Oral two times a day  losartan 50 milliGRAM(s) Oral daily  atorvastatin 20 milliGRAM(s) Oral at bedtime  aspirin enteric coated 162 milliGRAM(s) Oral daily    MEDICATIONS  (PRN):  heparin  Injectable 4100 Unit(s) IV Push every 6 hours PRN For aPTT less than 40  diphenhydrAMINE   Capsule 25 milliGRAM(s) Oral every 4 hours PRN Rash and/or Itching      FAMILY HISTORY:  Family history of diabetes mellitus type II (Mother)  Family history of premature CAD (Mother)      SOCIAL HISTORY:  non smoker  CIGARETTES:        Vital Signs Last 24 Hrs  T(C): 36.7 (24 Sep 2017 10:40), Max: 36.9 (24 Sep 2017 05:10)  T(F): 98 (24 Sep 2017 10:40), Max: 98.5 (24 Sep 2017 05:10)  HR: 74 (24 Sep 2017 10:40) (67 - 78)  BP: 148/77 (24 Sep 2017 10:40) (147/93 - 168/92)  BP(mean): 107 (23 Sep 2017 17:34) (107 - 107)  RR: 17 (24 Sep 2017 10:40) (16 - 18)  SpO2: 98% (24 Sep 2017 10:40) (97% - 100%)            INTERPRETATION OF TELEMETRY: SR     ECG:  SR with LVH and T wave in versions laterally   I&O's Detail    23 Sep 2017 07:01  -  24 Sep 2017 07:00  --------------------------------------------------------  IN:  Total IN: 0 mL    OUT:    Voided: 400 mL  Total OUT: 400 mL    Total NET: -400 mL          LABS:                        14.5   5.4   )-----------( 249      ( 24 Sep 2017 04:25 )             44.4     09-24    139  |  106  |  13  ----------------------------<  108<H>  3.8   |  29  |  0.87    Ca    9.4      24 Sep 2017 04:25    TPro  7.8  /  Alb  3.6  /  TBili  0.3  /  DBili  x   /  AST  32  /  ALT  47  /  AlkPhos  119  09-23    CARDIAC MARKERS ( 23 Sep 2017 12:40 )  0.018 ng/mL / x     / 120 U/L / x     / x      CARDIAC MARKERS ( 23 Sep 2017 05:44 )  0.031 ng/mL / x     / x     / x     / x      CARDIAC MARKERS ( 23 Sep 2017 02:21 )  0.033 ng/mL / x     / x     / x     / x          PT/INR - ( 23 Sep 2017 02:21 )   PT: 10.0 sec;   INR: 0.93 ratio         PTT - ( 24 Sep 2017 04:25 )  PTT:55.4 sec    I&O's Summary    23 Sep 2017 07:01  -  24 Sep 2017 07:00  --------------------------------------------------------  IN: 0 mL / OUT: 400 mL / NET: -400 mL      BNP  RADIOLOGY & ADDITIONAL STUDIES:

## 2017-09-25 NOTE — PROGRESS NOTE ADULT - ASSESSMENT
Non-obstructive disease in [facility]  Uncontrolled BP  Consider adding HCTZ   D/C planning

## 2017-09-25 NOTE — CHART NOTE - NSCHARTNOTEFT_GEN_A_CORE
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  Pt. states she was sleeping and felt "heart burn, couldn't breathe" and woke up. Reports that pain resolved upon arrival. and she reports that she has had similar episodes before. Notes that she was doing nothing out of the ordinary but did rush from basement for alarm. Pt has no other complaints and denies diaphoresis, lightheadedness, fever/chills, n/v/d and HA. Admitting being under a lot of stress related to job. (23 Sep 2017 09:35)      T(C): 36.4 (09-25-17 @ 09:15), Max: 37 (09-24-17 @ 17:06)  HR: 84 (09-25-17 @ 10:20) (77 - 86)  BP: 164/91 (09-25-17 @ 10:20) (151/74 - 164/91)  RR: 16 (09-25-17 @ 10:20) (16 - 17)  SpO2: 100% (09-25-17 @ 10:20) (99% - 100%)  Wt(kg): --    PHYSICAL EXAM:  Neurologic: Non-focal, AxOx3.  No neuro deficits  Vascular: Peripheral pulses palpable 2+ bilaterally  Procedure Site: Rt. groin perclose closure device noted site benign soft no bleeding no hematoma +1PP 	    LABS:	 	                        14.4   14.4  )-----------( 230      ( 25 Sep 2017 04:58 )             42.8     09-25    138  |  104  |  22  ----------------------------<  188<H>  4.2   |  27  |  1.02    Ca    9.4      25 Sep 2017 04:58      PROCEDURE RESULTS:  Non-Obstructive CAD,  normal LV systolic function., estimated LV ejection fraction is 65 %.      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  Pt. states she was sleeping and felt "heart burn, couldn't breathe" and woke up. Reports that pain resolved upon arrival. and she reports that she has had similar episodes before. Notes that she was doing nothing out of the ordinary but did rush from basement for alarm. Pt has no other complaints and denies diaphoresis, lightheadedness, fever/chills, n/v/d and HA. Admitting being under a lot of stress related to job. (23 Sep 2017 09:35)    -Aggressive medical management of coronary artery disease and its   underlying risk factors.	  -VS, labs, diet, activity as per post cath orders  -IV hydration  -Encourage PO fluids  -Continue current medications  -Plan of care D/W pt. and MD  -Post cath instructions reviewed with pt., pt. verbalizes and understands instructions  -Follow-up with attending

## 2017-09-25 NOTE — DISCHARGE NOTE ADULT - CARE PLAN
Principal Discharge DX:	Atypical chest pain  Goal:	resolve  Instructions for follow-up, activity and diet:	c/w diet and Aspirin  Secondary Diagnosis:	Hypertension, unspecified type  Goal:	control  Instructions for follow-up, activity and diet:	Low sodium diet, medications  compliance  Secondary Diagnosis:	Lip edema  Goal:	evaluate  Instructions for follow-up, activity and diet:	Need  allergologist evaluation  Secondary Diagnosis:	Diabetes  Goal:	control  Instructions for follow-up, activity and diet:	Low carb diet, start new med and f/u with PCP

## 2017-09-25 NOTE — DISCHARGE NOTE ADULT - CARE PROVIDER_API CALL
DR Hetcor,   Phone: (   )    -  Fax: (   )    -    Dharmesh Joyce (MD), Cardiovascular Disease; Internal Medicine; Interventional Cardiology  [address] [place] [address]  Phone: [phone]  Fax: [phone]

## 2017-09-25 NOTE — PACU DISCHARGE NOTE - COMMENTS
report to larissa conklin on 3e/ pt returning to 3e via remote monitor/confirmed telemetry by monitor tech

## 2017-09-28 DIAGNOSIS — I25.110 ATHEROSCLEROTIC HEART DISEASE OF NATIVE CORONARY ARTERY WITH UNSTABLE ANGINA PECTORIS: ICD-10-CM

## 2017-09-28 DIAGNOSIS — E11.9 TYPE 2 DIABETES MELLITUS WITHOUT COMPLICATIONS: ICD-10-CM

## 2017-09-28 DIAGNOSIS — R94.31 ABNORMAL ELECTROCARDIOGRAM [ECG] [EKG]: ICD-10-CM

## 2017-09-28 DIAGNOSIS — I10 ESSENTIAL (PRIMARY) HYPERTENSION: ICD-10-CM

## 2017-09-28 DIAGNOSIS — T78.3XXA ANGIONEUROTIC EDEMA, INITIAL ENCOUNTER: ICD-10-CM

## 2017-09-28 DIAGNOSIS — T50.905A ADVERSE EFFECT OF UNSPECIFIED DRUGS, MEDICAMENTS AND BIOLOGICAL SUBSTANCES, INITIAL ENCOUNTER: ICD-10-CM

## 2017-09-28 DIAGNOSIS — T88.7XXA UNSPECIFIED ADVERSE EFFECT OF DRUG OR MEDICAMENT, INITIAL ENCOUNTER: ICD-10-CM

## 2017-09-28 DIAGNOSIS — M41.9 SCOLIOSIS, UNSPECIFIED: ICD-10-CM

## 2017-10-18 PROBLEM — Z00.00 ENCOUNTER FOR PREVENTIVE HEALTH EXAMINATION: Noted: 2017-10-18

## 2017-10-26 ENCOUNTER — APPOINTMENT (OUTPATIENT)
Dept: CARDIOLOGY | Facility: CLINIC | Age: 57
End: 2017-10-26

## 2018-09-11 RX ORDER — AMLODIPINE BESYLATE 2.5 MG/1
0 TABLET ORAL
Qty: 0 | Refills: 0 | COMMUNITY

## 2018-09-11 RX ORDER — AZILSARTAN KAMEDOXOMIL 40 MG/1
40 TABLET ORAL
Qty: 0 | Refills: 0 | COMMUNITY

## 2018-09-11 RX ORDER — NEBIVOLOL HYDROCHLORIDE 5 MG/1
10 TABLET ORAL
Qty: 0 | Refills: 0 | COMMUNITY

## 2018-09-11 RX ORDER — NEBIVOLOL HYDROCHLORIDE 5 MG/1
0 TABLET ORAL
Qty: 0 | Refills: 0 | COMMUNITY

## 2018-09-11 RX ORDER — AMLODIPINE BESYLATE 2.5 MG/1
5 TABLET ORAL
Qty: 0 | Refills: 0 | COMMUNITY
